# Patient Record
Sex: FEMALE | Race: WHITE | NOT HISPANIC OR LATINO | URBAN - METROPOLITAN AREA
[De-identification: names, ages, dates, MRNs, and addresses within clinical notes are randomized per-mention and may not be internally consistent; named-entity substitution may affect disease eponyms.]

---

## 2017-02-28 ENCOUNTER — OFFICE (OUTPATIENT)
Dept: URBAN - METROPOLITAN AREA CLINIC 78 | Facility: CLINIC | Age: 57
End: 2017-02-28

## 2017-02-28 VITALS
WEIGHT: 189 LBS | HEIGHT: 61 IN | HEART RATE: 74 BPM | SYSTOLIC BLOOD PRESSURE: 187 MMHG | DIASTOLIC BLOOD PRESSURE: 103 MMHG | TEMPERATURE: 97.6 F

## 2017-02-28 DIAGNOSIS — R13.19 OTHER DYSPHAGIA: ICD-10-CM

## 2017-02-28 PROCEDURE — 99244 OFF/OP CNSLTJ NEW/EST MOD 40: CPT

## 2017-03-02 ENCOUNTER — ON CAMPUS - OUTPATIENT (OUTPATIENT)
Dept: URBAN - METROPOLITAN AREA HOSPITAL 14 | Facility: HOSPITAL | Age: 57
End: 2017-03-02

## 2017-03-02 DIAGNOSIS — K20.8 OTHER ESOPHAGITIS: ICD-10-CM

## 2017-03-02 DIAGNOSIS — R13.10 DYSPHAGIA, UNSPECIFIED: ICD-10-CM

## 2017-03-02 PROCEDURE — 43239 EGD BIOPSY SINGLE/MULTIPLE: CPT

## 2017-03-17 ENCOUNTER — OFFICE (OUTPATIENT)
Dept: URBAN - METROPOLITAN AREA CLINIC 78 | Facility: CLINIC | Age: 57
End: 2017-03-17
Payer: COMMERCIAL

## 2017-03-17 VITALS
SYSTOLIC BLOOD PRESSURE: 154 MMHG | WEIGHT: 187 LBS | DIASTOLIC BLOOD PRESSURE: 87 MMHG | TEMPERATURE: 98.5 F | HEART RATE: 66 BPM | HEIGHT: 61 IN

## 2017-03-17 DIAGNOSIS — R13.19 OTHER DYSPHAGIA: ICD-10-CM

## 2017-03-17 PROCEDURE — 99213 OFFICE O/P EST LOW 20 MIN: CPT

## 2021-01-13 ENCOUNTER — OFFICE (OUTPATIENT)
Dept: URBAN - METROPOLITAN AREA TELEHEALTH 3 | Facility: TELEHEALTH | Age: 61
End: 2021-01-13

## 2021-01-13 VITALS — WEIGHT: 185 LBS | HEIGHT: 61 IN

## 2021-01-13 DIAGNOSIS — R07.89 OTHER CHEST PAIN: ICD-10-CM

## 2021-01-13 DIAGNOSIS — R74.8 ABNORMAL LEVELS OF OTHER SERUM ENZYMES: ICD-10-CM

## 2021-01-13 DIAGNOSIS — R13.19 OTHER DYSPHAGIA: ICD-10-CM

## 2021-01-13 DIAGNOSIS — K76.0 FATTY (CHANGE OF) LIVER, NOT ELSEWHERE CLASSIFIED: ICD-10-CM

## 2021-01-13 DIAGNOSIS — K44.9 DIAPHRAGMATIC HERNIA WITHOUT OBSTRUCTION OR GANGRENE: ICD-10-CM

## 2021-01-13 PROCEDURE — 99204 OFFICE O/P NEW MOD 45 MIN: CPT | Mod: GQ | Performed by: INTERNAL MEDICINE

## 2021-01-13 NOTE — SERVICEHPINOTES
PATIENT VERIFIED BY DATE OF BIRTH AND NAME. Patient has been consented for this telecommunication visit. Pt has been referred for consultation by Zeb Neri for intermittent chest pain and possible ARZOLA.1) Chest pain: started summer 2020, initially thought to be stress-related. No other clear trigger. Intermittent symptoms, but progressively worsening with longer lasting episodes. Left sided pain, feels like "constant dull pain" though can have sharp component. Variable duration, but sometimes lasts several hours sometimes wakes from sleep. No radiation. Worse with stress no association with PO intake, nothing else worsens. Hasn't tried much to help symptoms. Associated regurgitation, especially with leaning forward associated dysphagia intermittently (solids = liquids). No odynophagia. No nausea or vomiting. No throat clearing, coughing, or vocal changes. Cardiac evaluation, including TTE, was unremarkable. No NSAIDs. No tobacco or alcohol use. Currently on pantoprazole once daily in the morning. No other antacid treatment.EGD 3/2017 showed mild esophagitis endoscopically, though biopsies were unremarkable normal stomach and duodenum. BREGD 9/2013 showed a hiatal hernia and mild, non-erosive gastritis (confirmed on biopsies) but otherwise was normal (as were random duodenal biopsies).BRColonoscopy 9/2013 was unremarkable, aside from small internal hemorrhoids. 2) Elevated liver enzymes: intermittently elevated liver enzymes through the years. Mild transaminase elevation only. No prior icteric illness. No family history of liver disease/cirrhosis. No alcohol use/abuse. No tranfusions, tattoos, or history of IV/inhalational drug use.  sees Dr. Swan for ARZOLA, and both he and PCP suggested evaluation. Rare left sided discomfort, no other abdominal discomfort. US 10/2020 showed a 7mm cystic focus in left hepatic lobe, but otherwise was unremarkable normal hepatic echogenicity.BRCT 9/2013 showed hepatic steatosis, but was otherwise unremarkable.BRSBFT 10/2013 was unremarkable.BRElevated liver enzymes 2016 (ALT 44, AST 51), with HbA1c 9.4%.10-point ROS completed with patient, pertinent positives/negatives outlined above.

## 2021-01-13 NOTE — INTERFACERESULTNOTES
Platelet count is slightly low, but blood counts otherwise are normal. Slight elevation in liver enzymes, but not concerning. Normal electrolytes, normal kidney function. Normal clotting function, which is good. Testing for other causes of liver disease is all negative - no inherited/genetic liver disease, no viral hepatitis or liver disease, no autoimmune causes. Continue with plans discussed at last visit. Follow up after EGD.

## 2021-01-15 LAB
ACTIN (SMOOTH MUSCLE) ANTIBODY: 5 UNITS (ref 0–19)
ALPHA-1-ANTITRYPSIN, SERUM: 171 MG/DL (ref 101–187)
AMBIG ABBREV CMP14 DEFAULT: (no result)
ANTINUCLEAR ANTIBODIES, IFA: NEGATIVE
CBC/DIFF AMBIGUOUS DEFAULT: BASO (ABSOLUTE): 0 X10E3/UL (ref 0–0.2)
CBC/DIFF AMBIGUOUS DEFAULT: BASOS: 0 %
CBC/DIFF AMBIGUOUS DEFAULT: EOS (ABSOLUTE): 0.1 X10E3/UL (ref 0–0.4)
CBC/DIFF AMBIGUOUS DEFAULT: EOS: 2 %
CBC/DIFF AMBIGUOUS DEFAULT: HEMATOCRIT: 39.4 % (ref 34–46.6)
CBC/DIFF AMBIGUOUS DEFAULT: HEMATOLOGY COMMENTS: (no result)
CBC/DIFF AMBIGUOUS DEFAULT: HEMOGLOBIN: 12.8 G/DL (ref 11.1–15.9)
CBC/DIFF AMBIGUOUS DEFAULT: IMMATURE CELLS: (no result)
CBC/DIFF AMBIGUOUS DEFAULT: IMMATURE GRANS (ABS): 0 X10E3/UL (ref 0–0.1)
CBC/DIFF AMBIGUOUS DEFAULT: IMMATURE GRANULOCYTES: 0 %
CBC/DIFF AMBIGUOUS DEFAULT: LYMPHS (ABSOLUTE): 1.4 X10E3/UL (ref 0.7–3.1)
CBC/DIFF AMBIGUOUS DEFAULT: LYMPHS: 37 %
CBC/DIFF AMBIGUOUS DEFAULT: MCH: 29 PG (ref 26.6–33)
CBC/DIFF AMBIGUOUS DEFAULT: MCHC: 32.5 G/DL (ref 31.5–35.7)
CBC/DIFF AMBIGUOUS DEFAULT: MCV: 89 FL (ref 79–97)
CBC/DIFF AMBIGUOUS DEFAULT: MONOCYTES(ABSOLUTE): 0.3 X10E3/UL (ref 0.1–0.9)
CBC/DIFF AMBIGUOUS DEFAULT: MONOCYTES: 6 %
CBC/DIFF AMBIGUOUS DEFAULT: NEUTROPHILS (ABSOLUTE): 2.1 X10E3/UL (ref 1.4–7)
CBC/DIFF AMBIGUOUS DEFAULT: NEUTROPHILS: 55 %
CBC/DIFF AMBIGUOUS DEFAULT: NRBC: (no result)
CBC/DIFF AMBIGUOUS DEFAULT: PLATELETS: 99 X10E3/UL — CRITICAL LOW (ref 150–450)
CBC/DIFF AMBIGUOUS DEFAULT: RBC: 4.41 X10E6/UL (ref 3.77–5.28)
CBC/DIFF AMBIGUOUS DEFAULT: RDW: 14.6 % (ref 11.7–15.4)
CBC/DIFF AMBIGUOUS DEFAULT: WBC: 3.9 X10E3/UL (ref 3.4–10.8)
CERULOPLASMIN: 35.5 MG/DL (ref 19–39)
COMP. METABOLIC PANEL (14): A/G RATIO: 1.4 (ref 1.2–2.2)
COMP. METABOLIC PANEL (14): ALBUMIN: 3.8 G/DL (ref 3.8–4.9)
COMP. METABOLIC PANEL (14): ALKALINE PHOSPHATASE: 143 IU/L — HIGH (ref 39–117)
COMP. METABOLIC PANEL (14): ALT (SGPT): 42 IU/L — HIGH (ref 0–32)
COMP. METABOLIC PANEL (14): AST (SGOT): 61 IU/L — HIGH (ref 0–40)
COMP. METABOLIC PANEL (14): BILIRUBIN, TOTAL: 1.1 MG/DL (ref 0–1.2)
COMP. METABOLIC PANEL (14): BUN/CREATININE RATIO: 15 (ref 9–23)
COMP. METABOLIC PANEL (14): BUN: 11 MG/DL (ref 6–24)
COMP. METABOLIC PANEL (14): CALCIUM: 10 MG/DL (ref 8.7–10.2)
COMP. METABOLIC PANEL (14): CARBON DIOXIDE, TOTAL: 25 MMOL/L (ref 20–29)
COMP. METABOLIC PANEL (14): CHLORIDE: 103 MMOL/L (ref 96–106)
COMP. METABOLIC PANEL (14): CREATININE: 0.71 MG/DL (ref 0.57–1)
COMP. METABOLIC PANEL (14): EGFR IF AFRICN AM: 109 ML/MIN/1.73 (ref 59–?)
COMP. METABOLIC PANEL (14): EGFR IF NONAFRICN AM: 94 ML/MIN/1.73 (ref 59–?)
COMP. METABOLIC PANEL (14): GLOBULIN, TOTAL: 2.8 G/DL (ref 1.5–4.5)
COMP. METABOLIC PANEL (14): GLUCOSE: 252 MG/DL — HIGH (ref 65–99)
COMP. METABOLIC PANEL (14): POTASSIUM: 3.5 MMOL/L (ref 3.5–5.2)
COMP. METABOLIC PANEL (14): PROTEIN, TOTAL: 6.6 G/DL (ref 6–8.5)
COMP. METABOLIC PANEL (14): SODIUM: 140 MMOL/L (ref 134–144)
FERRITIN, SERUM: 69 NG/ML (ref 15–150)
HBSAG SCREEN: NEGATIVE
HCV ANTIBODY: HEP C VIRUS AB: <0.1 S/CO RATIO
HEP A AB, TOTAL: NEGATIVE
HEP B CORE AB, TOT: NEGATIVE
HEP B SURFACE AB: HEP B SURFACE AB, QUAL: REACTIVE
IRON AND TIBC: IRON BIND.CAP.(TIBC): 348 UG/DL (ref 250–450)
IRON AND TIBC: IRON SATURATION: 24 % (ref 15–55)
IRON AND TIBC: IRON: 82 UG/DL (ref 27–159)
IRON AND TIBC: UIBC: 266 UG/DL (ref 131–425)
LIVER-KIDNEY MICROSOMAL AB: 0.8 UNITS (ref 0–20)
MITOCHONDRIAL (M2) ANTIBODY: <20 UNITS
PROTHROMBIN TIME (PT): INR: 1.1 (ref 0.9–1.2)
PROTHROMBIN TIME (PT): PROTHROMBIN TIME: 11.9 SEC (ref 9.1–12)

## 2021-01-20 ENCOUNTER — OFFICE (OUTPATIENT)
Dept: URBAN - METROPOLITAN AREA CLINIC 102 | Facility: CLINIC | Age: 61
End: 2021-01-20

## 2021-01-20 DIAGNOSIS — R74.8 ABNORMAL LEVELS OF OTHER SERUM ENZYMES: ICD-10-CM

## 2021-01-20 DIAGNOSIS — K76.0 FATTY (CHANGE OF) LIVER, NOT ELSEWHERE CLASSIFIED: ICD-10-CM

## 2021-01-20 PROCEDURE — 91200 LIVER ELASTOGRAPHY: CPT | Performed by: INTERNAL MEDICINE

## 2021-02-17 ENCOUNTER — ON CAMPUS - OUTPATIENT (OUTPATIENT)
Dept: URBAN - METROPOLITAN AREA HOSPITAL 16 | Facility: HOSPITAL | Age: 61
End: 2021-02-17

## 2021-02-17 DIAGNOSIS — R07.89 OTHER CHEST PAIN: ICD-10-CM

## 2021-02-17 DIAGNOSIS — K29.60 OTHER GASTRITIS WITHOUT BLEEDING: ICD-10-CM

## 2021-02-17 DIAGNOSIS — R13.10 DYSPHAGIA, UNSPECIFIED: ICD-10-CM

## 2021-02-17 PROCEDURE — 43239 EGD BIOPSY SINGLE/MULTIPLE: CPT | Performed by: INTERNAL MEDICINE

## 2021-03-10 ENCOUNTER — OFFICE (OUTPATIENT)
Dept: URBAN - METROPOLITAN AREA TELEHEALTH 12 | Facility: TELEHEALTH | Age: 61
End: 2021-03-10

## 2021-03-10 VITALS — HEIGHT: 61 IN | WEIGHT: 202 LBS

## 2021-03-10 DIAGNOSIS — K44.9 DIAPHRAGMATIC HERNIA WITHOUT OBSTRUCTION OR GANGRENE: ICD-10-CM

## 2021-03-10 DIAGNOSIS — R74.8 ABNORMAL LEVELS OF OTHER SERUM ENZYMES: ICD-10-CM

## 2021-03-10 DIAGNOSIS — K76.0 FATTY (CHANGE OF) LIVER, NOT ELSEWHERE CLASSIFIED: ICD-10-CM

## 2021-03-10 DIAGNOSIS — R07.89 OTHER CHEST PAIN: ICD-10-CM

## 2021-03-10 PROCEDURE — 99214 OFFICE O/P EST MOD 30 MIN: CPT | Mod: 95 | Performed by: INTERNAL MEDICINE

## 2021-10-05 ENCOUNTER — OFFICE (OUTPATIENT)
Dept: URBAN - METROPOLITAN AREA TELEHEALTH 3 | Facility: TELEHEALTH | Age: 61
End: 2021-10-05

## 2021-10-05 VITALS — WEIGHT: 190 LBS | HEIGHT: 61 IN

## 2021-10-05 DIAGNOSIS — D69.6 THROMBOCYTOPENIA, UNSPECIFIED: ICD-10-CM

## 2021-10-05 DIAGNOSIS — K44.9 DIAPHRAGMATIC HERNIA WITHOUT OBSTRUCTION OR GANGRENE: ICD-10-CM

## 2021-10-05 DIAGNOSIS — K76.0 FATTY (CHANGE OF) LIVER, NOT ELSEWHERE CLASSIFIED: ICD-10-CM

## 2021-10-05 DIAGNOSIS — R07.89 OTHER CHEST PAIN: ICD-10-CM

## 2021-10-05 PROCEDURE — 99214 OFFICE O/P EST MOD 30 MIN: CPT | Mod: 95 | Performed by: INTERNAL MEDICINE

## 2021-10-05 NOTE — SERVICEHPINOTES
PATIENT VERIFIED BY DATE OF BIRTH AND NAME. Patient has been consented for this telecommunication visit.
magi sow
Pt presents for follow up.
magi sow 1) Chest pain: ongoing intermittent issues. Now feeling it's significantly stress related while improved in frequency, the intensity has become a bit more severe. Can have ongoing spasms throughout the day at worst, but typically resolves within a few minutes. Can be worse with certain foods (rice), but definitely worse with stress. Lots of work-related stresses, trying to change offices which may help reduce stress. Hasn't really tried much to help symptoms. Has not tried pepcid. On pantoprazole once daily. Periodic bloating, working with endocrinologist continues and consideration of insulin pump in the future.2) Elevated liver enzymes: serologic evaluation of abnormal liver enzymes was unremarkable. 3.9 > 12.8 &lt 99. , ALT 42, AST 61 nl Alb/bili. INR 1.1. Fibroscan 1/2021 showed S3 steatosis and F0-1 fibrosis. US 10/2020 showed a 7mm cystic focus in left hepatic lobe, but otherwise was unremarkable normal hepatic echogenicity. Previous CT 9/2013 showed hepatic steatosis, but was otherwise unremarkable. Thrombocytopenia persists, and has noticed increasing fatigue (stress-related?) and easy bruising. EGD 2/2021 showed a ringed appearance to esophagus, hiatal hernia, and mild non-erosive gastritis biopsies from esophagus were normal, and those from stomach confirmed gastritis. Previous EGD 3/2017 showed mild esophagitis endoscopically, though biopsies were unremarkable normal stomach and duodenum. EGD 9/2013 showed a hiatal hernia and mild, non-erosive gastritis.Colonoscopy 9/2013 was unremarkable, aside from small internal hemorrhoids.10-point ROS completed with patient, pertinent positives/negatives outlined above.    magi

## 2021-10-08 NOTE — SERVICEHPINOTES
PATIENT VERIFIED BY DATE OF BIRTH AND NAME. Patient has been consented for this telecommunication visit.Pt presents for follow up.1) Chest/epigastric pain: ongoing issues that wax and wane in intensity. Definite stress-related component. Nausea and dyspepsia in xiphoid region, had lingering issues after EGD. Continued use of pantoprazole, hasn't tried Pepcid yet. Appetite can be diminished at times, also has early satiety and bloating. EGD 2/2021 showed a ringed appearance to esophagus, hiatal hernia, and mild non-erosive gastritis biopsies from esophagus were normal, and those from stomach confirmed gastritis. Has not had testing for gastroparesis in the past DM challenging to manage at times, working with endocrinology.2) Elevated liver enzymes: serologic evaluation of abnormal liver enzymes was unremarkable. 3.9 > 12.8 &lt 99. , ALT 42, AST 61 nl Alb/bili. INR 1.1. Fibroscan 1/2021 showed S3 steatosis and F0-1 fibrosis. US 10/2020 showed a 7mm cystic focus in left hepatic lobe, but otherwise was unremarkable normal hepatic echogenicity. Previous CT 9/2013 showed hepatic steatosis, but was otherwise unremarkable.Previous EGD 3/2017 showed mild esophagitis endoscopically, though biopsies were unremarkable normal stomach and duodenum. EGD 9/2013 showed a hiatal hernia and mild, non-erosive gastritis.Colonoscopy 9/2013 was unremarkable, aside from small internal hemorrhoids. 10-point ROS completed with patient, pertinent positives/negatives outlined above. 
08-Oct-2021 13:50

## 2022-07-29 ENCOUNTER — OFFICE (OUTPATIENT)
Dept: URBAN - METROPOLITAN AREA CLINIC 34 | Facility: CLINIC | Age: 62
End: 2022-07-29

## 2022-07-29 VITALS
TEMPERATURE: 97.7 F | DIASTOLIC BLOOD PRESSURE: 70 MMHG | SYSTOLIC BLOOD PRESSURE: 153 MMHG | HEIGHT: 61 IN | WEIGHT: 184 LBS | HEART RATE: 76 BPM

## 2022-07-29 DIAGNOSIS — R10.32 LEFT LOWER QUADRANT PAIN: ICD-10-CM

## 2022-07-29 DIAGNOSIS — R13.19 OTHER DYSPHAGIA: ICD-10-CM

## 2022-07-29 DIAGNOSIS — K76.0 FATTY (CHANGE OF) LIVER, NOT ELSEWHERE CLASSIFIED: ICD-10-CM

## 2022-07-29 DIAGNOSIS — K44.9 DIAPHRAGMATIC HERNIA WITHOUT OBSTRUCTION OR GANGRENE: ICD-10-CM

## 2022-07-29 PROCEDURE — 99214 OFFICE O/P EST MOD 30 MIN: CPT | Performed by: INTERNAL MEDICINE

## 2023-08-08 PROBLEM — Z12.11 SCREENING COLON: Status: ACTIVE | Noted: 2023-08-08

## 2023-09-05 ENCOUNTER — TELEHEALTH PROVIDED OTHER THAN IN PATIENT'S HOME (OUTPATIENT)
Dept: URBAN - METROPOLITAN AREA TELEHEALTH 7 | Facility: TELEHEALTH | Age: 63
End: 2023-09-05
Payer: COMMERCIAL

## 2023-09-05 VITALS — WEIGHT: 180 LBS | HEIGHT: 61 IN

## 2023-09-05 DIAGNOSIS — Z12.11 ENCOUNTER FOR SCREENING FOR MALIGNANT NEOPLASM OF COLON: ICD-10-CM

## 2023-09-05 DIAGNOSIS — F07.81 POSTCONCUSSIONAL SYNDROME: ICD-10-CM

## 2023-09-05 PROCEDURE — 99213 OFFICE O/P EST LOW 20 MIN: CPT | Mod: 95 | Performed by: PHYSICIAN ASSISTANT

## 2023-09-05 NOTE — SERVICENOTES
Patient's visit was conducted through [employer] video telecommunication. Patient consented before the start of visit as to understanding of privacy concerns, possible technological failure, and their responsibility of carrying out instructions of plan.

## 2023-09-05 NOTE — SERVICEHPINOTES
PATIENT VERIFIED BY DATE OF BIRTH AND NAME. Patient has been consented for this telecommunication visit.
magi sow 64 yo female presents to discuss a colonoscopy. She is due for her 10-year colonoscopy but she fell and hit her head in July 2022 and had traumatic head bleed and reports concussion and post-concussion syndrome. Just fell 2 times again recently and is having a number of neurological symptoms, including some speech difficulties and memory issues. She is wanting to hold off on her colonoscopy probably until early next year as she is feeling a need to address her neurological symptoms more first. She feels very overwhelmed and is struggling to deal with day to day things. Planning to apply for disability. She denies change in bowel habits or blood in stools. No family h/o colon cancer. 
magi sow Normally follows with Dr. Townsend for ARZOLA (no h/o fibrosis) and dysphagia (chronic/multifactorial). 
magi sow ROS as above, otherwise negative. magi

## 2023-10-31 ENCOUNTER — OFFICE (OUTPATIENT)
Dept: URBAN - METROPOLITAN AREA CLINIC 34 | Facility: CLINIC | Age: 63
End: 2023-10-31
Payer: COMMERCIAL

## 2023-10-31 VITALS
WEIGHT: 200 LBS | HEIGHT: 61 IN | DIASTOLIC BLOOD PRESSURE: 79 MMHG | TEMPERATURE: 97.8 F | HEART RATE: 76 BPM | SYSTOLIC BLOOD PRESSURE: 155 MMHG

## 2023-10-31 DIAGNOSIS — R14.0 ABDOMINAL DISTENSION (GASEOUS): ICD-10-CM

## 2023-10-31 DIAGNOSIS — R10.32 LEFT LOWER QUADRANT PAIN: ICD-10-CM

## 2023-10-31 DIAGNOSIS — R06.00 DYSPNEA, UNSPECIFIED: ICD-10-CM

## 2023-10-31 DIAGNOSIS — R63.5 ABNORMAL WEIGHT GAIN: ICD-10-CM

## 2023-10-31 DIAGNOSIS — R18.8 OTHER ASCITES: ICD-10-CM

## 2023-10-31 DIAGNOSIS — K76.0 FATTY (CHANGE OF) LIVER, NOT ELSEWHERE CLASSIFIED: ICD-10-CM

## 2023-10-31 PROCEDURE — 99215 OFFICE O/P EST HI 40 MIN: CPT | Performed by: INTERNAL MEDICINE

## 2024-01-12 ENCOUNTER — OFFICE (OUTPATIENT)
Dept: URBAN - METROPOLITAN AREA CLINIC 102 | Facility: CLINIC | Age: 64
End: 2024-01-12

## 2024-01-12 VITALS
TEMPERATURE: 97.7 F | SYSTOLIC BLOOD PRESSURE: 133 MMHG | DIASTOLIC BLOOD PRESSURE: 74 MMHG | HEART RATE: 72 BPM | HEIGHT: 61 IN | WEIGHT: 170 LBS

## 2024-01-12 DIAGNOSIS — R18.8 OTHER ASCITES: ICD-10-CM

## 2024-01-12 DIAGNOSIS — R74.8 ABNORMAL LEVELS OF OTHER SERUM ENZYMES: ICD-10-CM

## 2024-01-12 DIAGNOSIS — K76.6 PORTAL HYPERTENSION: ICD-10-CM

## 2024-01-12 DIAGNOSIS — K74.60 UNSPECIFIED CIRRHOSIS OF LIVER: ICD-10-CM

## 2024-01-12 DIAGNOSIS — K75.81 NONALCOHOLIC STEATOHEPATITIS (NASH): ICD-10-CM

## 2024-01-12 PROCEDURE — 99215 OFFICE O/P EST HI 40 MIN: CPT | Performed by: INTERNAL MEDICINE

## 2024-01-12 RX ORDER — FUROSEMIDE 20 MG/1
20 TABLET ORAL
Qty: 30 | Refills: 11 | Status: ACTIVE
Start: 2024-01-12

## 2024-01-12 RX ORDER — PROPRANOLOL HYDROCHLORIDE 10 MG/1
TABLET ORAL
Qty: 30 | Refills: 0 | Status: COMPLETED
End: 2024-08-26

## 2024-01-12 RX ORDER — RIFAXIMIN 550 MG/1
1100 TABLET ORAL
Qty: 60 | Refills: 11 | Status: COMPLETED
Start: 2024-01-12 | End: 2024-07-17

## 2024-07-17 ENCOUNTER — OFFICE (OUTPATIENT)
Dept: URBAN - METROPOLITAN AREA CLINIC 34 | Facility: CLINIC | Age: 64
End: 2024-07-17
Payer: COMMERCIAL

## 2024-07-17 VITALS
DIASTOLIC BLOOD PRESSURE: 71 MMHG | TEMPERATURE: 97.3 F | SYSTOLIC BLOOD PRESSURE: 167 MMHG | HEART RATE: 67 BPM | WEIGHT: 162 LBS | HEIGHT: 61 IN

## 2024-07-17 DIAGNOSIS — K74.60 UNSPECIFIED CIRRHOSIS OF LIVER: ICD-10-CM

## 2024-07-17 DIAGNOSIS — R53.83 OTHER FATIGUE: ICD-10-CM

## 2024-07-17 DIAGNOSIS — L29.9 PRURITUS, UNSPECIFIED: ICD-10-CM

## 2024-07-17 DIAGNOSIS — K75.81 NONALCOHOLIC STEATOHEPATITIS (NASH): ICD-10-CM

## 2024-07-17 DIAGNOSIS — R10.84 GENERALIZED ABDOMINAL PAIN: ICD-10-CM

## 2024-07-17 PROCEDURE — 99215 OFFICE O/P EST HI 40 MIN: CPT | Performed by: INTERNAL MEDICINE

## 2024-07-17 RX ORDER — HYDROXYZINE HYDROCHLORIDE 25 MG/1
TABLET, FILM COATED ORAL
Qty: 90 | Refills: 3 | Status: ACTIVE
Start: 2024-07-17

## 2024-12-03 ENCOUNTER — ON CAMPUS - OUTPATIENT (OUTPATIENT)
Dept: URBAN - METROPOLITAN AREA HOSPITAL 16 | Facility: HOSPITAL | Age: 64
End: 2024-12-03
Payer: COMMERCIAL

## 2024-12-03 DIAGNOSIS — D12.5 BENIGN NEOPLASM OF SIGMOID COLON: ICD-10-CM

## 2024-12-03 DIAGNOSIS — K64.9 UNSPECIFIED HEMORRHOIDS: ICD-10-CM

## 2024-12-03 DIAGNOSIS — Z12.11 ENCOUNTER FOR SCREENING FOR MALIGNANT NEOPLASM OF COLON: ICD-10-CM

## 2024-12-03 DIAGNOSIS — D12.3 BENIGN NEOPLASM OF TRANSVERSE COLON: ICD-10-CM

## 2024-12-03 DIAGNOSIS — D12.0 BENIGN NEOPLASM OF CECUM: ICD-10-CM

## 2024-12-03 DIAGNOSIS — K63.5 POLYP OF COLON: ICD-10-CM

## 2024-12-03 PROCEDURE — 45385 COLONOSCOPY W/LESION REMOVAL: CPT | Mod: PT | Performed by: INTERNAL MEDICINE

## 2024-12-03 PROCEDURE — 45380 COLONOSCOPY AND BIOPSY: CPT | Mod: 59,PT | Performed by: INTERNAL MEDICINE

## 2024-12-03 PROCEDURE — 45380 COLONOSCOPY AND BIOPSY: CPT | Mod: PT,59 | Performed by: INTERNAL MEDICINE

## 2024-12-20 ENCOUNTER — OFFICE (OUTPATIENT)
Dept: URBAN - METROPOLITAN AREA CLINIC 79 | Facility: CLINIC | Age: 64
End: 2024-12-20
Payer: COMMERCIAL

## 2024-12-20 VITALS
DIASTOLIC BLOOD PRESSURE: 76 MMHG | TEMPERATURE: 97.7 F | WEIGHT: 157 LBS | HEART RATE: 75 BPM | SYSTOLIC BLOOD PRESSURE: 161 MMHG | HEIGHT: 61 IN

## 2024-12-20 DIAGNOSIS — K74.60 UNSPECIFIED CIRRHOSIS OF LIVER: ICD-10-CM

## 2024-12-20 DIAGNOSIS — K76.82 HEPATIC ENCEPHALOPATHY: ICD-10-CM

## 2024-12-20 DIAGNOSIS — R11.0 NAUSEA: ICD-10-CM

## 2024-12-20 DIAGNOSIS — K75.81 NONALCOHOLIC STEATOHEPATITIS (NASH): ICD-10-CM

## 2024-12-20 PROCEDURE — 99215 OFFICE O/P EST HI 40 MIN: CPT | Performed by: INTERNAL MEDICINE

## 2025-04-09 ENCOUNTER — OFFICE (OUTPATIENT)
Dept: URBAN - METROPOLITAN AREA CLINIC 102 | Facility: CLINIC | Age: 65
End: 2025-04-09
Payer: COMMERCIAL

## 2025-04-09 VITALS
DIASTOLIC BLOOD PRESSURE: 71 MMHG | HEART RATE: 66 BPM | SYSTOLIC BLOOD PRESSURE: 135 MMHG | TEMPERATURE: 97.8 F | HEIGHT: 61 IN | WEIGHT: 165 LBS

## 2025-04-09 DIAGNOSIS — K76.82 HEPATIC ENCEPHALOPATHY: ICD-10-CM

## 2025-04-09 DIAGNOSIS — R11.0 NAUSEA: ICD-10-CM

## 2025-04-09 DIAGNOSIS — K75.81 NONALCOHOLIC STEATOHEPATITIS (NASH): ICD-10-CM

## 2025-04-09 DIAGNOSIS — K74.60 UNSPECIFIED CIRRHOSIS OF LIVER: ICD-10-CM

## 2025-04-09 PROCEDURE — 99215 OFFICE O/P EST HI 40 MIN: CPT | Performed by: INTERNAL MEDICINE

## 2025-07-30 ENCOUNTER — OFFICE (OUTPATIENT)
Dept: URBAN - METROPOLITAN AREA CLINIC 102 | Facility: CLINIC | Age: 65
End: 2025-07-30
Payer: COMMERCIAL

## 2025-07-30 VITALS
HEART RATE: 70 BPM | WEIGHT: 150 LBS | DIASTOLIC BLOOD PRESSURE: 77 MMHG | DIASTOLIC BLOOD PRESSURE: 71 MMHG | SYSTOLIC BLOOD PRESSURE: 154 MMHG | SYSTOLIC BLOOD PRESSURE: 155 MMHG | TEMPERATURE: 97.5 F | HEIGHT: 61 IN

## 2025-07-30 DIAGNOSIS — K74.60 UNSPECIFIED CIRRHOSIS OF LIVER: ICD-10-CM

## 2025-07-30 DIAGNOSIS — K75.81 NONALCOHOLIC STEATOHEPATITIS (NASH): ICD-10-CM

## 2025-07-30 DIAGNOSIS — R68.89 OTHER GENERAL SYMPTOMS AND SIGNS: ICD-10-CM

## 2025-07-30 DIAGNOSIS — R11.2 NAUSEA WITH VOMITING, UNSPECIFIED: ICD-10-CM

## 2025-07-30 PROCEDURE — 99215 OFFICE O/P EST HI 40 MIN: CPT | Performed by: INTERNAL MEDICINE
